# Patient Record
Sex: FEMALE | URBAN - METROPOLITAN AREA
[De-identification: names, ages, dates, MRNs, and addresses within clinical notes are randomized per-mention and may not be internally consistent; named-entity substitution may affect disease eponyms.]

---

## 2017-06-29 LAB
HPV COMMENT: NORMAL
HPV TYPE 16: NOT DETECTED
HPV TYPE 18: NOT DETECTED
HPVOH (OTHER TYPES): NOT DETECTED

## 2025-05-20 NOTE — PROGRESS NOTES
Cheri Larson   40 y.o. female   1985    This is patient's first visit with me.  Cheri Larson is here to establish care as their new PCP.      Reason(s) for visit:   Chief Complaint   Patient presents with    New Patient    Establish Care     HPI:    Office visit encounter:    Bio:  -Occupation: works for [employer] since around 2021. Pt does annual biometric screening and it has been normal.    Pt reported of some issues of hemorrhoids that comes and goes that started after having children (13 and 15 yo). She noticed some of bulging of her rectum for the past 6 months. She has not had any issues of rectal bleeding, but at times has noticed at times streak of bloody stool about once a week secondary to straining. She has tried her best to consume a high fiber diet and keep herself well hydrated. She didn't report of taking any OTC meds. She hasn't sought care for this issue until now. She denied any known FMH of colon or rectal CA.    PDMP monitoring:  -Revealed no controlled medications written of any kind.    -Last report:   Last PDMP [place] as Reviewed ([place]):  Review User Review Instant Review Result   EMMA NOEL 5/20/2025  2:17 PM Reviewed PDMP [1]     No Known Allergies    Current Outpatient Medications on File Prior to Visit   Medication Sig Dispense Refill    Pediatric Multiple Vit-C-FA (CHILDRENS CHEWABLE VITAMINS PO) Take 2 tablets by mouth daily.       No current facility-administered medications on file prior to visit.        Family History   Problem Relation Age of Onset    Diabetes Paternal Grandfather     High Cholesterol Paternal Grandfather        Social History     Tobacco Use    Smoking status: Never    Smokeless tobacco: Never   Substance Use Topics    Alcohol use: No        Lab Results   Component Value Date    WBC 8.7 03/17/2013    HGB 13.7 03/17/2013    HCT 42.5 03/17/2013    MCV 81.6 03/17/2013     03/17/2013       Chemistry        Component Value Date/Time

## 2025-05-29 ENCOUNTER — OFFICE VISIT (OUTPATIENT)
Dept: FAMILY MEDICINE CLINIC | Age: 40
End: 2025-05-29
Payer: COMMERCIAL

## 2025-05-29 VITALS
HEART RATE: 79 BPM | OXYGEN SATURATION: 98 % | BODY MASS INDEX: 24.74 KG/M2 | SYSTOLIC BLOOD PRESSURE: 100 MMHG | HEIGHT: 67 IN | WEIGHT: 157.6 LBS | DIASTOLIC BLOOD PRESSURE: 60 MMHG

## 2025-05-29 DIAGNOSIS — K64.4 EXTERNAL HEMORRHOID: ICD-10-CM

## 2025-05-29 DIAGNOSIS — Z76.89 ENCOUNTER TO ESTABLISH CARE WITH NEW DOCTOR: Primary | ICD-10-CM

## 2025-05-29 PROCEDURE — 99203 OFFICE O/P NEW LOW 30 MIN: CPT | Performed by: FAMILY MEDICINE

## 2025-05-29 SDOH — ECONOMIC STABILITY: FOOD INSECURITY: WITHIN THE PAST 12 MONTHS, THE FOOD YOU BOUGHT JUST DIDN'T LAST AND YOU DIDN'T HAVE MONEY TO GET MORE.: NEVER TRUE

## 2025-05-29 SDOH — ECONOMIC STABILITY: FOOD INSECURITY: WITHIN THE PAST 12 MONTHS, YOU WORRIED THAT YOUR FOOD WOULD RUN OUT BEFORE YOU GOT MONEY TO BUY MORE.: NEVER TRUE

## 2025-05-29 SDOH — HEALTH STABILITY: PHYSICAL HEALTH: ON AVERAGE, HOW MANY MINUTES DO YOU ENGAGE IN EXERCISE AT THIS LEVEL?: 60 MIN

## 2025-05-29 SDOH — HEALTH STABILITY: PHYSICAL HEALTH: ON AVERAGE, HOW MANY DAYS PER WEEK DO YOU ENGAGE IN MODERATE TO STRENUOUS EXERCISE (LIKE A BRISK WALK)?: 7 DAYS

## 2025-05-29 ASSESSMENT — PATIENT HEALTH QUESTIONNAIRE - PHQ9
SUM OF ALL RESPONSES TO PHQ QUESTIONS 1-9: 0
SUM OF ALL RESPONSES TO PHQ QUESTIONS 1-9: 0
1. LITTLE INTEREST OR PLEASURE IN DOING THINGS: NOT AT ALL
2. FEELING DOWN, DEPRESSED OR HOPELESS: NOT AT ALL
SUM OF ALL RESPONSES TO PHQ QUESTIONS 1-9: 0
SUM OF ALL RESPONSES TO PHQ QUESTIONS 1-9: 0

## 2025-05-29 ASSESSMENT — ENCOUNTER SYMPTOMS
NAUSEA: 0
SHORTNESS OF BREATH: 0
BLOOD IN STOOL: 0
ABDOMINAL PAIN: 0
DIARRHEA: 0
CHEST TIGHTNESS: 0
ABDOMINAL DISTENTION: 0
RHINORRHEA: 0
VOMITING: 0
TROUBLE SWALLOWING: 0
SINUS PRESSURE: 0
CONSTIPATION: 0
BACK PAIN: 0
COUGH: 0
WHEEZING: 0